# Patient Record
Sex: MALE | Race: WHITE | NOT HISPANIC OR LATINO | Employment: UNEMPLOYED | URBAN - NONMETROPOLITAN AREA
[De-identification: names, ages, dates, MRNs, and addresses within clinical notes are randomized per-mention and may not be internally consistent; named-entity substitution may affect disease eponyms.]

---

## 2017-11-27 ENCOUNTER — HOSPITAL ENCOUNTER (INPATIENT)
Facility: HOSPITAL | Age: 37
LOS: 2 days | Discharge: HOME | End: 2017-11-29
Attending: PSYCHIATRY & NEUROLOGY | Admitting: PSYCHIATRY & NEUROLOGY

## 2017-11-27 ENCOUNTER — HOSPITAL ENCOUNTER (EMERGENCY)
Facility: HOSPITAL | Age: 37
Discharge: STILL ADMITTED OR ADMINISTRATIVE | End: 2017-11-27
Attending: FAMILY MEDICINE

## 2017-11-27 VITALS
HEART RATE: 92 BPM | DIASTOLIC BLOOD PRESSURE: 77 MMHG | BODY MASS INDEX: 18.48 KG/M2 | HEIGHT: 71 IN | TEMPERATURE: 97.2 F | WEIGHT: 132 LBS | SYSTOLIC BLOOD PRESSURE: 131 MMHG | OXYGEN SATURATION: 99 % | RESPIRATION RATE: 18 BRPM

## 2017-11-27 DIAGNOSIS — R45.851 DEPRESSION WITH SUICIDAL IDEATION: Primary | ICD-10-CM

## 2017-11-27 DIAGNOSIS — F32.A DEPRESSION WITH SUICIDAL IDEATION: Primary | ICD-10-CM

## 2017-11-27 LAB
6-ACETYL MORPHINE: NEGATIVE
ALBUMIN SERPL-MCNC: 4.5 G/DL (ref 3.5–5)
ALBUMIN/GLOB SERPL: 1.7 G/DL (ref 1.5–2.5)
ALP SERPL-CCNC: 127 U/L (ref 40–129)
ALT SERPL W P-5'-P-CCNC: 81 U/L (ref 10–44)
AMPHET+METHAMPHET UR QL: NEGATIVE
ANION GAP SERPL CALCULATED.3IONS-SCNC: 5.9 MMOL/L (ref 3.6–11.2)
AST SERPL-CCNC: 156 U/L (ref 10–34)
BARBITURATES UR QL SCN: NEGATIVE
BASOPHILS # BLD AUTO: 0.06 10*3/MM3 (ref 0–0.3)
BASOPHILS NFR BLD AUTO: 0.5 % (ref 0–2)
BENZODIAZ UR QL SCN: NEGATIVE
BILIRUB SERPL-MCNC: 0.3 MG/DL (ref 0.2–1.8)
BILIRUB UR QL STRIP: NEGATIVE
BUN BLD-MCNC: <5 MG/DL (ref 7–21)
BUN/CREAT SERPL: ABNORMAL (ref 7–25)
BUPRENORPHINE SERPL-MCNC: NEGATIVE NG/ML
CALCIUM SPEC-SCNC: 9.3 MG/DL (ref 7.7–10)
CANNABINOIDS SERPL QL: NEGATIVE
CHLORIDE SERPL-SCNC: 108 MMOL/L (ref 99–112)
CLARITY UR: CLEAR
CO2 SERPL-SCNC: 27.1 MMOL/L (ref 24.3–31.9)
COCAINE UR QL: NEGATIVE
COLOR UR: YELLOW
CREAT BLD-MCNC: 0.66 MG/DL (ref 0.43–1.29)
DEPRECATED RDW RBC AUTO: 45.6 FL (ref 37–54)
EOSINOPHIL # BLD AUTO: 0.38 10*3/MM3 (ref 0–0.7)
EOSINOPHIL NFR BLD AUTO: 3.4 % (ref 0–5)
ERYTHROCYTE [DISTWIDTH] IN BLOOD BY AUTOMATED COUNT: 14.2 % (ref 11.5–14.5)
ETHANOL BLD-MCNC: <10 MG/DL
ETHANOL UR QL: <0.01 %
GFR SERPL CREATININE-BSD FRML MDRD: 134 ML/MIN/1.73
GLOBULIN UR ELPH-MCNC: 2.7 GM/DL
GLUCOSE BLD-MCNC: 95 MG/DL (ref 70–110)
GLUCOSE UR STRIP-MCNC: ABNORMAL MG/DL
HCT VFR BLD AUTO: 46.5 % (ref 42–52)
HGB BLD-MCNC: 15.6 G/DL (ref 14–18)
HGB UR QL STRIP.AUTO: NEGATIVE
IMM GRANULOCYTES # BLD: 0.01 10*3/MM3 (ref 0–0.03)
IMM GRANULOCYTES NFR BLD: 0.1 % (ref 0–0.5)
KETONES UR QL STRIP: NEGATIVE
LEUKOCYTE ESTERASE UR QL STRIP.AUTO: NEGATIVE
LYMPHOCYTES # BLD AUTO: 4.19 10*3/MM3 (ref 1–3)
LYMPHOCYTES NFR BLD AUTO: 37.9 % (ref 21–51)
MCH RBC QN AUTO: 29.7 PG (ref 27–33)
MCHC RBC AUTO-ENTMCNC: 33.5 G/DL (ref 33–37)
MCV RBC AUTO: 88.4 FL (ref 80–94)
METHADONE UR QL SCN: NEGATIVE
MONOCYTES # BLD AUTO: 0.84 10*3/MM3 (ref 0.1–0.9)
MONOCYTES NFR BLD AUTO: 7.6 % (ref 0–10)
NEUTROPHILS # BLD AUTO: 5.59 10*3/MM3 (ref 1.4–6.5)
NEUTROPHILS NFR BLD AUTO: 50.5 % (ref 30–70)
NITRITE UR QL STRIP: NEGATIVE
OPIATES UR QL: NEGATIVE
OSMOLALITY SERPL CALC.SUM OF ELEC: NORMAL MOSM/KG (ref 273–305)
OXYCODONE UR QL SCN: POSITIVE
PCP UR QL SCN: NEGATIVE
PH UR STRIP.AUTO: 7.5 [PH] (ref 5–8)
PLATELET # BLD AUTO: 243 10*3/MM3 (ref 130–400)
PMV BLD AUTO: 11.3 FL (ref 6–10)
POTASSIUM BLD-SCNC: 3 MMOL/L (ref 3.5–5.3)
PROT SERPL-MCNC: 7.2 G/DL (ref 6–8)
PROT UR QL STRIP: NEGATIVE
RBC # BLD AUTO: 5.26 10*6/MM3 (ref 4.7–6.1)
SODIUM BLD-SCNC: 141 MMOL/L (ref 135–153)
SP GR UR STRIP: 1.01 (ref 1–1.03)
UROBILINOGEN UR QL STRIP: ABNORMAL
WBC NRBC COR # BLD: 11.07 10*3/MM3 (ref 4.5–12.5)

## 2017-11-27 PROCEDURE — 93005 ELECTROCARDIOGRAM TRACING: CPT | Performed by: PSYCHIATRY & NEUROLOGY

## 2017-11-27 PROCEDURE — 93010 ELECTROCARDIOGRAM REPORT: CPT | Performed by: INTERNAL MEDICINE

## 2017-11-27 RX ORDER — OXYCODONE AND ACETAMINOPHEN 10; 325 MG/1; MG/1
1 TABLET ORAL 3 TIMES DAILY PRN
Status: DISPENSED | OUTPATIENT
Start: 2017-11-27 | End: 2017-11-28

## 2017-11-27 RX ORDER — BENZONATATE 100 MG/1
100 CAPSULE ORAL 3 TIMES DAILY PRN
Status: DISCONTINUED | OUTPATIENT
Start: 2017-11-27 | End: 2017-11-29 | Stop reason: HOSPADM

## 2017-11-27 RX ORDER — ECHINACEA PURPUREA EXTRACT 125 MG
2 TABLET ORAL AS NEEDED
Status: DISCONTINUED | OUTPATIENT
Start: 2017-11-27 | End: 2017-11-29 | Stop reason: HOSPADM

## 2017-11-27 RX ORDER — ONDANSETRON 4 MG/1
4 TABLET, FILM COATED ORAL EVERY 6 HOURS PRN
Status: DISCONTINUED | OUTPATIENT
Start: 2017-11-27 | End: 2017-11-29 | Stop reason: HOSPADM

## 2017-11-27 RX ORDER — FAMOTIDINE 20 MG/1
20 TABLET, FILM COATED ORAL 2 TIMES DAILY PRN
Status: DISCONTINUED | OUTPATIENT
Start: 2017-11-27 | End: 2017-11-29 | Stop reason: HOSPADM

## 2017-11-27 RX ORDER — GABAPENTIN 400 MG/1
800 CAPSULE ORAL EVERY 8 HOURS SCHEDULED
Status: DISCONTINUED | OUTPATIENT
Start: 2017-11-27 | End: 2017-11-28

## 2017-11-27 RX ORDER — LOPERAMIDE HYDROCHLORIDE 2 MG/1
2 CAPSULE ORAL 4 TIMES DAILY PRN
Status: DISCONTINUED | OUTPATIENT
Start: 2017-11-27 | End: 2017-11-29 | Stop reason: HOSPADM

## 2017-11-27 RX ORDER — POTASSIUM CHLORIDE 750 MG/1
40 CAPSULE, EXTENDED RELEASE ORAL AS NEEDED
Status: DISCONTINUED | OUTPATIENT
Start: 2017-11-27 | End: 2017-11-29 | Stop reason: HOSPADM

## 2017-11-27 RX ORDER — TRAZODONE HYDROCHLORIDE 50 MG/1
50 TABLET ORAL NIGHTLY PRN
Status: DISCONTINUED | OUTPATIENT
Start: 2017-11-27 | End: 2017-11-29 | Stop reason: HOSPADM

## 2017-11-27 RX ORDER — OXYCODONE AND ACETAMINOPHEN 10; 325 MG/1; MG/1
1 TABLET ORAL 3 TIMES DAILY PRN
COMMUNITY

## 2017-11-27 RX ORDER — BENZTROPINE MESYLATE 1 MG/ML
0.5 INJECTION INTRAMUSCULAR; INTRAVENOUS DAILY PRN
Status: DISCONTINUED | OUTPATIENT
Start: 2017-11-27 | End: 2017-11-29 | Stop reason: HOSPADM

## 2017-11-27 RX ORDER — ALUMINA, MAGNESIA, AND SIMETHICONE 2400; 2400; 240 MG/30ML; MG/30ML; MG/30ML
15 SUSPENSION ORAL EVERY 6 HOURS PRN
Status: DISCONTINUED | OUTPATIENT
Start: 2017-11-27 | End: 2017-11-29 | Stop reason: HOSPADM

## 2017-11-27 RX ORDER — BENZTROPINE MESYLATE 1 MG/1
1 TABLET ORAL DAILY PRN
Status: DISCONTINUED | OUTPATIENT
Start: 2017-11-27 | End: 2017-11-29 | Stop reason: HOSPADM

## 2017-11-27 RX ORDER — GABAPENTIN 800 MG/1
800 TABLET ORAL 3 TIMES DAILY
COMMUNITY

## 2017-11-27 RX ORDER — HYDROXYZINE 50 MG/1
50 TABLET, FILM COATED ORAL EVERY 6 HOURS PRN
Status: DISCONTINUED | OUTPATIENT
Start: 2017-11-27 | End: 2017-11-29 | Stop reason: HOSPADM

## 2017-11-27 RX ORDER — ACETAMINOPHEN 325 MG/1
650 TABLET ORAL EVERY 4 HOURS PRN
Status: DISCONTINUED | OUTPATIENT
Start: 2017-11-27 | End: 2017-11-29

## 2017-11-27 RX ORDER — POTASSIUM CHLORIDE 20 MEQ/1
40 TABLET, EXTENDED RELEASE ORAL EVERY 4 HOURS
Status: COMPLETED | OUTPATIENT
Start: 2017-11-27 | End: 2017-11-28

## 2017-11-27 RX ORDER — NICOTINE 21 MG/24HR
1 PATCH, TRANSDERMAL 24 HOURS TRANSDERMAL DAILY
Status: DISCONTINUED | OUTPATIENT
Start: 2017-11-27 | End: 2017-11-29 | Stop reason: HOSPADM

## 2017-11-27 RX ADMIN — POTASSIUM CHLORIDE 40 MEQ: 1500 TABLET, EXTENDED RELEASE ORAL at 17:08

## 2017-11-27 RX ADMIN — NICOTINE 1 PATCH: 21 PATCH TRANSDERMAL at 17:08

## 2017-11-27 RX ADMIN — POTASSIUM CHLORIDE 40 MEQ: 1500 TABLET, EXTENDED RELEASE ORAL at 21:31

## 2017-11-27 RX ADMIN — GABAPENTIN 800 MG: 400 CAPSULE ORAL at 21:31

## 2017-11-27 RX ADMIN — PANCRELIPASE 24000 UNITS OF LIPASE: 60000; 12000; 38000 CAPSULE, DELAYED RELEASE PELLETS ORAL at 19:49

## 2017-11-27 RX ADMIN — OXYCODONE HYDROCHLORIDE AND ACETAMINOPHEN 1 TABLET: 10; 325 TABLET ORAL at 19:51

## 2017-11-28 LAB — POTASSIUM BLD-SCNC: 4.4 MMOL/L (ref 3.5–5.3)

## 2017-11-28 PROCEDURE — 84132 ASSAY OF SERUM POTASSIUM: CPT | Performed by: PSYCHIATRY & NEUROLOGY

## 2017-11-28 RX ORDER — GABAPENTIN 400 MG/1
800 CAPSULE ORAL 3 TIMES DAILY
Status: DISCONTINUED | OUTPATIENT
Start: 2017-11-28 | End: 2017-11-29 | Stop reason: HOSPADM

## 2017-11-28 RX ORDER — OXYCODONE AND ACETAMINOPHEN 10; 325 MG/1; MG/1
1 TABLET ORAL 3 TIMES DAILY PRN
Status: DISPENSED | OUTPATIENT
Start: 2017-11-28 | End: 2017-11-29

## 2017-11-28 RX ADMIN — GABAPENTIN 800 MG: 400 CAPSULE ORAL at 16:57

## 2017-11-28 RX ADMIN — OXYCODONE HYDROCHLORIDE AND ACETAMINOPHEN 1 TABLET: 10; 325 TABLET ORAL at 14:28

## 2017-11-28 RX ADMIN — PANCRELIPASE 24000 UNITS OF LIPASE: 60000; 12000; 38000 CAPSULE, DELAYED RELEASE PELLETS ORAL at 17:06

## 2017-11-28 RX ADMIN — GABAPENTIN 800 MG: 400 CAPSULE ORAL at 20:45

## 2017-11-28 RX ADMIN — PANCRELIPASE 24000 UNITS OF LIPASE: 60000; 12000; 38000 CAPSULE, DELAYED RELEASE PELLETS ORAL at 12:29

## 2017-11-28 RX ADMIN — PANCRELIPASE 24000 UNITS OF LIPASE: 60000; 12000; 38000 CAPSULE, DELAYED RELEASE PELLETS ORAL at 08:48

## 2017-11-28 RX ADMIN — TRAZODONE HYDROCHLORIDE 50 MG: 50 TABLET ORAL at 20:45

## 2017-11-28 RX ADMIN — POTASSIUM CHLORIDE 40 MEQ: 1500 TABLET, EXTENDED RELEASE ORAL at 02:17

## 2017-11-28 RX ADMIN — NICOTINE 1 PATCH: 21 PATCH TRANSDERMAL at 08:47

## 2017-11-28 RX ADMIN — ACETAMINOPHEN 650 MG: 325 TABLET ORAL at 15:27

## 2017-11-28 RX ADMIN — GABAPENTIN 800 MG: 400 CAPSULE ORAL at 05:24

## 2017-11-28 RX ADMIN — OXYCODONE HYDROCHLORIDE AND ACETAMINOPHEN 1 TABLET: 10; 325 TABLET ORAL at 05:47

## 2017-11-29 VITALS
WEIGHT: 129 LBS | RESPIRATION RATE: 18 BRPM | TEMPERATURE: 98.6 F | HEIGHT: 71 IN | BODY MASS INDEX: 18.06 KG/M2 | SYSTOLIC BLOOD PRESSURE: 121 MMHG | DIASTOLIC BLOOD PRESSURE: 73 MMHG | OXYGEN SATURATION: 95 % | HEART RATE: 99 BPM

## 2017-11-29 RX ORDER — TRAZODONE HYDROCHLORIDE 50 MG/1
50 TABLET ORAL NIGHTLY PRN
Qty: 30 TABLET | Refills: 0 | Status: SHIPPED | OUTPATIENT
Start: 2017-11-29 | End: 2017-12-29

## 2017-11-29 RX ORDER — IBUPROFEN 600 MG/1
600 TABLET ORAL EVERY 6 HOURS SCHEDULED
Status: DISCONTINUED | OUTPATIENT
Start: 2017-11-29 | End: 2017-11-29

## 2017-11-29 RX ORDER — IBUPROFEN 600 MG/1
600 TABLET ORAL EVERY 6 HOURS PRN
Status: DISCONTINUED | OUTPATIENT
Start: 2017-11-29 | End: 2017-11-29 | Stop reason: HOSPADM

## 2017-11-29 RX ADMIN — GABAPENTIN 800 MG: 400 CAPSULE ORAL at 09:03

## 2017-11-29 RX ADMIN — NICOTINE 1 PATCH: 21 PATCH TRANSDERMAL at 09:03

## 2017-11-29 RX ADMIN — PANCRELIPASE 24000 UNITS OF LIPASE: 60000; 12000; 38000 CAPSULE, DELAYED RELEASE PELLETS ORAL at 12:23

## 2017-11-29 RX ADMIN — IBUPROFEN 600 MG: 600 TABLET ORAL at 05:26

## 2017-11-29 RX ADMIN — IBUPROFEN 600 MG: 600 TABLET ORAL at 12:25

## 2017-11-29 RX ADMIN — PANCRELIPASE 24000 UNITS OF LIPASE: 60000; 12000; 38000 CAPSULE, DELAYED RELEASE PELLETS ORAL at 09:03

## 2018-03-18 PROCEDURE — 99284 EMERGENCY DEPT VISIT MOD MDM: CPT

## 2018-03-19 ENCOUNTER — APPOINTMENT (OUTPATIENT)
Dept: CT IMAGING | Facility: HOSPITAL | Age: 38
End: 2018-03-19

## 2018-03-19 ENCOUNTER — HOSPITAL ENCOUNTER (EMERGENCY)
Facility: HOSPITAL | Age: 38
Discharge: HOME | End: 2018-03-19
Attending: FAMILY MEDICINE | Admitting: FAMILY MEDICINE

## 2018-03-19 VITALS
HEART RATE: 80 BPM | TEMPERATURE: 98.4 F | OXYGEN SATURATION: 95 % | DIASTOLIC BLOOD PRESSURE: 92 MMHG | HEIGHT: 71 IN | SYSTOLIC BLOOD PRESSURE: 123 MMHG | WEIGHT: 130 LBS | BODY MASS INDEX: 18.2 KG/M2 | RESPIRATION RATE: 18 BRPM

## 2018-03-19 DIAGNOSIS — E87.6 ACUTE HYPOKALEMIA: ICD-10-CM

## 2018-03-19 DIAGNOSIS — R10.9 ABDOMINAL PAIN, ACUTE: Primary | ICD-10-CM

## 2018-03-19 LAB
ALBUMIN SERPL-MCNC: 3.9 G/DL (ref 3.5–5)
ALBUMIN/GLOB SERPL: 1.4 G/DL (ref 1.5–2.5)
ALP SERPL-CCNC: 112 U/L (ref 40–129)
ALT SERPL W P-5'-P-CCNC: 27 U/L (ref 10–44)
AMYLASE SERPL-CCNC: 15 U/L (ref 28–100)
ANION GAP SERPL CALCULATED.3IONS-SCNC: 5.9 MMOL/L (ref 3.6–11.2)
AST SERPL-CCNC: 23 U/L (ref 10–34)
BASOPHILS # BLD AUTO: 0.08 10*3/MM3 (ref 0–0.3)
BASOPHILS NFR BLD AUTO: 0.5 % (ref 0–2)
BILIRUB SERPL-MCNC: 0.9 MG/DL (ref 0.2–1.8)
BILIRUB UR QL STRIP: NEGATIVE
BUN BLD-MCNC: 10 MG/DL (ref 7–21)
BUN/CREAT SERPL: 12.5 (ref 7–25)
CALCIUM SPEC-SCNC: 9.2 MG/DL (ref 7.7–10)
CHLORIDE SERPL-SCNC: 103 MMOL/L (ref 99–112)
CLARITY UR: CLEAR
CO2 SERPL-SCNC: 28.1 MMOL/L (ref 24.3–31.9)
COLOR UR: YELLOW
CREAT BLD-MCNC: 0.8 MG/DL (ref 0.43–1.29)
DEPRECATED RDW RBC AUTO: 43 FL (ref 37–54)
EOSINOPHIL # BLD AUTO: 0.35 10*3/MM3 (ref 0–0.7)
EOSINOPHIL NFR BLD AUTO: 2.3 % (ref 0–5)
ERYTHROCYTE [DISTWIDTH] IN BLOOD BY AUTOMATED COUNT: 13.8 % (ref 11.5–14.5)
GFR SERPL CREATININE-BSD FRML MDRD: 108 ML/MIN/1.73
GLOBULIN UR ELPH-MCNC: 2.7 GM/DL
GLUCOSE BLD-MCNC: 113 MG/DL (ref 70–110)
GLUCOSE UR STRIP-MCNC: NEGATIVE MG/DL
HCT VFR BLD AUTO: 45 % (ref 42–52)
HGB BLD-MCNC: 15.5 G/DL (ref 14–18)
HGB UR QL STRIP.AUTO: NEGATIVE
IMM GRANULOCYTES # BLD: 0.03 10*3/MM3 (ref 0–0.03)
IMM GRANULOCYTES NFR BLD: 0.2 % (ref 0–0.5)
KETONES UR QL STRIP: NEGATIVE
LEUKOCYTE ESTERASE UR QL STRIP.AUTO: NEGATIVE
LIPASE SERPL-CCNC: 24 U/L (ref 13–60)
LYMPHOCYTES # BLD AUTO: 4.49 10*3/MM3 (ref 1–3)
LYMPHOCYTES NFR BLD AUTO: 30 % (ref 21–51)
MCH RBC QN AUTO: 29 PG (ref 27–33)
MCHC RBC AUTO-ENTMCNC: 34.4 G/DL (ref 33–37)
MCV RBC AUTO: 84.3 FL (ref 80–94)
MONOCYTES # BLD AUTO: 1.22 10*3/MM3 (ref 0.1–0.9)
MONOCYTES NFR BLD AUTO: 8.1 % (ref 0–10)
NEUTROPHILS # BLD AUTO: 8.81 10*3/MM3 (ref 1.4–6.5)
NEUTROPHILS NFR BLD AUTO: 58.9 % (ref 30–70)
NITRITE UR QL STRIP: NEGATIVE
OSMOLALITY SERPL CALC.SUM OF ELEC: 273.7 MOSM/KG (ref 273–305)
PH UR STRIP.AUTO: 7.5 [PH] (ref 5–8)
PLATELET # BLD AUTO: 338 10*3/MM3 (ref 130–400)
PMV BLD AUTO: 10.7 FL (ref 6–10)
POTASSIUM BLD-SCNC: 2.7 MMOL/L (ref 3.5–5.3)
PROT SERPL-MCNC: 6.6 G/DL (ref 6–8)
PROT UR QL STRIP: NEGATIVE
RBC # BLD AUTO: 5.34 10*6/MM3 (ref 4.7–6.1)
SODIUM BLD-SCNC: 137 MMOL/L (ref 135–153)
SP GR UR STRIP: <=1.005 (ref 1–1.03)
UROBILINOGEN UR QL STRIP: NORMAL
WBC NRBC COR # BLD: 14.98 10*3/MM3 (ref 4.5–12.5)

## 2018-03-19 PROCEDURE — 25010000002 HYDROMORPHONE PER 4 MG: Performed by: FAMILY MEDICINE

## 2018-03-19 PROCEDURE — 74176 CT ABD & PELVIS W/O CONTRAST: CPT

## 2018-03-19 PROCEDURE — 96375 TX/PRO/DX INJ NEW DRUG ADDON: CPT

## 2018-03-19 PROCEDURE — 96374 THER/PROPH/DIAG INJ IV PUSH: CPT

## 2018-03-19 PROCEDURE — 85025 COMPLETE CBC W/AUTO DIFF WBC: CPT | Performed by: FAMILY MEDICINE

## 2018-03-19 PROCEDURE — 93005 ELECTROCARDIOGRAM TRACING: CPT | Performed by: FAMILY MEDICINE

## 2018-03-19 PROCEDURE — 96361 HYDRATE IV INFUSION ADD-ON: CPT

## 2018-03-19 PROCEDURE — 74176 CT ABD & PELVIS W/O CONTRAST: CPT | Performed by: RADIOLOGY

## 2018-03-19 PROCEDURE — 82150 ASSAY OF AMYLASE: CPT | Performed by: FAMILY MEDICINE

## 2018-03-19 PROCEDURE — 25010000002 ONDANSETRON PER 1 MG: Performed by: FAMILY MEDICINE

## 2018-03-19 PROCEDURE — 81003 URINALYSIS AUTO W/O SCOPE: CPT | Performed by: FAMILY MEDICINE

## 2018-03-19 PROCEDURE — 83690 ASSAY OF LIPASE: CPT | Performed by: FAMILY MEDICINE

## 2018-03-19 PROCEDURE — 93010 ELECTROCARDIOGRAM REPORT: CPT | Performed by: INTERNAL MEDICINE

## 2018-03-19 PROCEDURE — 80053 COMPREHEN METABOLIC PANEL: CPT | Performed by: FAMILY MEDICINE

## 2018-03-19 RX ORDER — NICOTINE 21 MG/24HR
1 PATCH, TRANSDERMAL 24 HOURS TRANSDERMAL ONCE
Status: DISCONTINUED | OUTPATIENT
Start: 2018-03-19 | End: 2018-03-19 | Stop reason: HOSPADM

## 2018-03-19 RX ORDER — POTASSIUM CHLORIDE 7.45 MG/ML
10 INJECTION INTRAVENOUS ONCE
Status: DISCONTINUED | OUTPATIENT
Start: 2018-03-19 | End: 2018-03-19

## 2018-03-19 RX ORDER — POTASSIUM CHLORIDE 20 MEQ/1
40 TABLET, EXTENDED RELEASE ORAL ONCE
Status: COMPLETED | OUTPATIENT
Start: 2018-03-19 | End: 2018-03-19

## 2018-03-19 RX ORDER — METAXALONE 800 MG/1
800 TABLET ORAL 4 TIMES DAILY PRN
Qty: 40 TABLET | Refills: 0 | Status: SHIPPED | OUTPATIENT
Start: 2018-03-19 | End: 2019-03-05

## 2018-03-19 RX ORDER — OXYCODONE AND ACETAMINOPHEN 10; 325 MG/1; MG/1
1 TABLET ORAL ONCE
Status: COMPLETED | OUTPATIENT
Start: 2018-03-19 | End: 2018-03-19

## 2018-03-19 RX ORDER — SODIUM CHLORIDE 0.9 % (FLUSH) 0.9 %
10 SYRINGE (ML) INJECTION AS NEEDED
Status: DISCONTINUED | OUTPATIENT
Start: 2018-03-19 | End: 2018-03-19 | Stop reason: HOSPADM

## 2018-03-19 RX ORDER — HYDROMORPHONE HCL 110MG/55ML
1 PATIENT CONTROLLED ANALGESIA SYRINGE INTRAVENOUS ONCE
Status: COMPLETED | OUTPATIENT
Start: 2018-03-19 | End: 2018-03-19

## 2018-03-19 RX ORDER — ONDANSETRON 2 MG/ML
4 INJECTION INTRAMUSCULAR; INTRAVENOUS ONCE
Status: COMPLETED | OUTPATIENT
Start: 2018-03-19 | End: 2018-03-19

## 2018-03-19 RX ORDER — FAMOTIDINE 10 MG/ML
20 INJECTION, SOLUTION INTRAVENOUS ONCE
Status: COMPLETED | OUTPATIENT
Start: 2018-03-19 | End: 2018-03-19

## 2018-03-19 RX ADMIN — FAMOTIDINE 20 MG: 10 INJECTION INTRAVENOUS at 00:44

## 2018-03-19 RX ADMIN — HYDROMORPHONE HYDROCHLORIDE 1 MG: 2 INJECTION INTRAMUSCULAR; INTRAVENOUS; SUBCUTANEOUS at 00:45

## 2018-03-19 RX ADMIN — POTASSIUM CHLORIDE 40 MEQ: 1500 TABLET, EXTENDED RELEASE ORAL at 01:29

## 2018-03-19 RX ADMIN — OXYCODONE HYDROCHLORIDE AND ACETAMINOPHEN 1 TABLET: 10; 325 TABLET ORAL at 02:08

## 2018-03-19 RX ADMIN — SODIUM CHLORIDE 1000 ML: 9 INJECTION, SOLUTION INTRAVENOUS at 00:41

## 2018-03-19 RX ADMIN — NICOTINE 1 PATCH: 21 PATCH TRANSDERMAL at 01:18

## 2018-03-19 RX ADMIN — ONDANSETRON 4 MG: 2 INJECTION, SOLUTION INTRAMUSCULAR; INTRAVENOUS at 00:42
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
## 2018-04-07 ENCOUNTER — HOSPITAL ENCOUNTER (EMERGENCY)
Facility: HOSPITAL | Age: 38
Discharge: HOME | End: 2018-04-07
Attending: EMERGENCY MEDICINE | Admitting: EMERGENCY MEDICINE

## 2018-04-07 ENCOUNTER — APPOINTMENT (OUTPATIENT)
Dept: GENERAL RADIOLOGY | Facility: HOSPITAL | Age: 38
End: 2018-04-07

## 2018-04-07 VITALS
WEIGHT: 130 LBS | DIASTOLIC BLOOD PRESSURE: 80 MMHG | RESPIRATION RATE: 18 BRPM | BODY MASS INDEX: 18.2 KG/M2 | TEMPERATURE: 98.7 F | HEART RATE: 79 BPM | OXYGEN SATURATION: 98 % | SYSTOLIC BLOOD PRESSURE: 112 MMHG | HEIGHT: 71 IN

## 2018-04-07 DIAGNOSIS — R10.12 LUQ ABDOMINAL PAIN: Primary | ICD-10-CM

## 2018-04-07 LAB
ALBUMIN SERPL-MCNC: 3.7 G/DL (ref 3.5–5)
ALBUMIN/GLOB SERPL: 1.4 G/DL (ref 1.5–2.5)
ALP SERPL-CCNC: 110 U/L (ref 40–129)
ALT SERPL W P-5'-P-CCNC: 24 U/L (ref 10–44)
AMYLASE SERPL-CCNC: 27 U/L (ref 28–100)
ANION GAP SERPL CALCULATED.3IONS-SCNC: 4.4 MMOL/L (ref 3.6–11.2)
AST SERPL-CCNC: 21 U/L (ref 10–34)
BASOPHILS # BLD AUTO: 0.04 10*3/MM3 (ref 0–0.3)
BASOPHILS NFR BLD AUTO: 0.4 % (ref 0–2)
BILIRUB SERPL-MCNC: 0.3 MG/DL (ref 0.2–1.8)
BILIRUB UR QL STRIP: NEGATIVE
BUN BLD-MCNC: <5 MG/DL (ref 7–21)
BUN/CREAT SERPL: ABNORMAL (ref 7–25)
CALCIUM SPEC-SCNC: 8.8 MG/DL (ref 7.7–10)
CHLORIDE SERPL-SCNC: 107 MMOL/L (ref 99–112)
CLARITY UR: CLEAR
CO2 SERPL-SCNC: 28.6 MMOL/L (ref 24.3–31.9)
COLOR UR: YELLOW
CREAT BLD-MCNC: 0.88 MG/DL (ref 0.43–1.29)
DEPRECATED RDW RBC AUTO: 48.2 FL (ref 37–54)
EOSINOPHIL # BLD AUTO: 0.39 10*3/MM3 (ref 0–0.7)
EOSINOPHIL NFR BLD AUTO: 3.9 % (ref 0–5)
ERYTHROCYTE [DISTWIDTH] IN BLOOD BY AUTOMATED COUNT: 14.9 % (ref 11.5–14.5)
GFR SERPL CREATININE-BSD FRML MDRD: 96 ML/MIN/1.73
GLOBULIN UR ELPH-MCNC: 2.6 GM/DL
GLUCOSE BLD-MCNC: 140 MG/DL (ref 70–110)
GLUCOSE UR STRIP-MCNC: NEGATIVE MG/DL
HCT VFR BLD AUTO: 42.7 % (ref 42–52)
HGB BLD-MCNC: 14.6 G/DL (ref 14–18)
HGB UR QL STRIP.AUTO: NEGATIVE
HOLD SPECIMEN: NORMAL
HOLD SPECIMEN: NORMAL
IMM GRANULOCYTES # BLD: 0.02 10*3/MM3 (ref 0–0.03)
IMM GRANULOCYTES NFR BLD: 0.2 % (ref 0–0.5)
KETONES UR QL STRIP: NEGATIVE
LEUKOCYTE ESTERASE UR QL STRIP.AUTO: NEGATIVE
LIPASE SERPL-CCNC: 23 U/L (ref 13–60)
LYMPHOCYTES # BLD AUTO: 3.19 10*3/MM3 (ref 1–3)
LYMPHOCYTES NFR BLD AUTO: 31.6 % (ref 21–51)
MCH RBC QN AUTO: 30.2 PG (ref 27–33)
MCHC RBC AUTO-ENTMCNC: 34.2 G/DL (ref 33–37)
MCV RBC AUTO: 88.2 FL (ref 80–94)
MONOCYTES # BLD AUTO: 0.58 10*3/MM3 (ref 0.1–0.9)
MONOCYTES NFR BLD AUTO: 5.7 % (ref 0–10)
NEUTROPHILS # BLD AUTO: 5.87 10*3/MM3 (ref 1.4–6.5)
NEUTROPHILS NFR BLD AUTO: 58.2 % (ref 30–70)
NITRITE UR QL STRIP: NEGATIVE
OSMOLALITY SERPL CALC.SUM OF ELEC: NORMAL MOSM/KG (ref 273–305)
PH UR STRIP.AUTO: 6.5 [PH] (ref 5–8)
PLATELET # BLD AUTO: 265 10*3/MM3 (ref 130–400)
PMV BLD AUTO: 11.1 FL (ref 6–10)
POTASSIUM BLD-SCNC: 3.2 MMOL/L (ref 3.5–5.3)
PROT SERPL-MCNC: 6.3 G/DL (ref 6–8)
PROT UR QL STRIP: NEGATIVE
RBC # BLD AUTO: 4.84 10*6/MM3 (ref 4.7–6.1)
SODIUM BLD-SCNC: 140 MMOL/L (ref 135–153)
SP GR UR STRIP: 1.01 (ref 1–1.03)
UROBILINOGEN UR QL STRIP: NORMAL
WBC NRBC COR # BLD: 10.09 10*3/MM3 (ref 4.5–12.5)
WHOLE BLOOD HOLD SPECIMEN: NORMAL
WHOLE BLOOD HOLD SPECIMEN: NORMAL

## 2018-04-07 PROCEDURE — 99284 EMERGENCY DEPT VISIT MOD MDM: CPT

## 2018-04-07 PROCEDURE — 96361 HYDRATE IV INFUSION ADD-ON: CPT

## 2018-04-07 PROCEDURE — 85025 COMPLETE CBC W/AUTO DIFF WBC: CPT | Performed by: EMERGENCY MEDICINE

## 2018-04-07 PROCEDURE — 82150 ASSAY OF AMYLASE: CPT | Performed by: EMERGENCY MEDICINE

## 2018-04-07 PROCEDURE — 74022 RADEX COMPL AQT ABD SERIES: CPT | Performed by: RADIOLOGY

## 2018-04-07 PROCEDURE — 96375 TX/PRO/DX INJ NEW DRUG ADDON: CPT

## 2018-04-07 PROCEDURE — 25010000002 KETOROLAC TROMETHAMINE PER 15 MG: Performed by: EMERGENCY MEDICINE

## 2018-04-07 PROCEDURE — 83690 ASSAY OF LIPASE: CPT | Performed by: EMERGENCY MEDICINE

## 2018-04-07 PROCEDURE — 74022 RADEX COMPL AQT ABD SERIES: CPT

## 2018-04-07 PROCEDURE — 80053 COMPREHEN METABOLIC PANEL: CPT | Performed by: EMERGENCY MEDICINE

## 2018-04-07 PROCEDURE — 81003 URINALYSIS AUTO W/O SCOPE: CPT | Performed by: EMERGENCY MEDICINE

## 2018-04-07 PROCEDURE — 96374 THER/PROPH/DIAG INJ IV PUSH: CPT

## 2018-04-07 RX ORDER — SODIUM CHLORIDE 0.9 % (FLUSH) 0.9 %
10 SYRINGE (ML) INJECTION AS NEEDED
Status: DISCONTINUED | OUTPATIENT
Start: 2018-04-07 | End: 2018-04-07 | Stop reason: HOSPADM

## 2018-04-07 RX ORDER — SODIUM CHLORIDE 9 MG/ML
1000 INJECTION, SOLUTION INTRAVENOUS ONCE
Status: COMPLETED | OUTPATIENT
Start: 2018-04-07 | End: 2018-04-07

## 2018-04-07 RX ORDER — FAMOTIDINE 10 MG/ML
20 INJECTION, SOLUTION INTRAVENOUS ONCE
Status: COMPLETED | OUTPATIENT
Start: 2018-04-07 | End: 2018-04-07

## 2018-04-07 RX ORDER — POTASSIUM CHLORIDE 20 MEQ/1
40 TABLET, EXTENDED RELEASE ORAL ONCE
Status: COMPLETED | OUTPATIENT
Start: 2018-04-07 | End: 2018-04-07

## 2018-04-07 RX ORDER — KETOROLAC TROMETHAMINE 30 MG/ML
30 INJECTION, SOLUTION INTRAMUSCULAR; INTRAVENOUS EVERY 6 HOURS PRN
Status: DISCONTINUED | OUTPATIENT
Start: 2018-04-07 | End: 2018-04-07 | Stop reason: HOSPADM

## 2018-04-07 RX ORDER — ALUMINA, MAGNESIA, AND SIMETHICONE 2400; 2400; 240 MG/30ML; MG/30ML; MG/30ML
15 SUSPENSION ORAL ONCE
Status: COMPLETED | OUTPATIENT
Start: 2018-04-07 | End: 2018-04-07

## 2018-04-07 RX ADMIN — POTASSIUM CHLORIDE 40 MEQ: 1500 TABLET, EXTENDED RELEASE ORAL at 17:50

## 2018-04-07 RX ADMIN — FAMOTIDINE 20 MG: 10 INJECTION INTRAVENOUS at 16:59

## 2018-04-07 RX ADMIN — LIDOCAINE HYDROCHLORIDE 15 ML: 20 SOLUTION ORAL; TOPICAL at 16:59

## 2018-04-07 RX ADMIN — ALUMINUM HYDROXIDE, MAGNESIUM HYDROXIDE, AND DIMETHICONE 15 ML: 400; 400; 40 SUSPENSION ORAL at 16:59

## 2018-04-07 RX ADMIN — KETOROLAC TROMETHAMINE 30 MG: 30 INJECTION, SOLUTION INTRAMUSCULAR; INTRAVENOUS at 17:17

## 2018-04-07 RX ADMIN — SODIUM CHLORIDE 1000 ML: 9 INJECTION, SOLUTION INTRAVENOUS at 16:58

## 2018-04-07 NOTE — ED NOTES
"Pt wife came to nurse's station and states \"he needs something else for pain. If they ain't gonna do something for him, we're gonna go to [place]\". Provider notified.      Siva Ochoa RN  04/07/18 1712       Siva Ochoa RN  04/07/18 1720    "

## 2018-06-06 ENCOUNTER — HOSPITAL ENCOUNTER (EMERGENCY)
Facility: HOSPITAL | Age: 38
Discharge: HOME | End: 2018-06-06
Attending: EMERGENCY MEDICINE | Admitting: EMERGENCY MEDICINE

## 2018-06-06 ENCOUNTER — APPOINTMENT (OUTPATIENT)
Dept: GENERAL RADIOLOGY | Facility: HOSPITAL | Age: 38
End: 2018-06-06

## 2018-06-06 ENCOUNTER — APPOINTMENT (OUTPATIENT)
Dept: CT IMAGING | Facility: HOSPITAL | Age: 38
End: 2018-06-06

## 2018-06-06 VITALS
HEIGHT: 71 IN | SYSTOLIC BLOOD PRESSURE: 110 MMHG | TEMPERATURE: 98.3 F | DIASTOLIC BLOOD PRESSURE: 67 MMHG | RESPIRATION RATE: 16 BRPM | OXYGEN SATURATION: 99 % | BODY MASS INDEX: 18.9 KG/M2 | HEART RATE: 74 BPM | WEIGHT: 135 LBS

## 2018-06-06 DIAGNOSIS — K52.9 COLITIS: Primary | ICD-10-CM

## 2018-06-06 LAB
6-ACETYL MORPHINE: NEGATIVE
ALBUMIN SERPL-MCNC: 3.7 G/DL (ref 3.5–5)
ALBUMIN/GLOB SERPL: 1.5 G/DL (ref 1.5–2.5)
ALP SERPL-CCNC: 110 U/L (ref 40–129)
ALT SERPL W P-5'-P-CCNC: 28 U/L (ref 10–44)
AMPHET+METHAMPHET UR QL: NEGATIVE
AMYLASE SERPL-CCNC: 30 U/L (ref 28–100)
ANION GAP SERPL CALCULATED.3IONS-SCNC: 4.5 MMOL/L (ref 3.6–11.2)
AST SERPL-CCNC: 23 U/L (ref 10–34)
BARBITURATES UR QL SCN: NEGATIVE
BASOPHILS # BLD AUTO: 0.04 10*3/MM3 (ref 0–0.3)
BASOPHILS NFR BLD AUTO: 0.3 % (ref 0–2)
BENZODIAZ UR QL SCN: NEGATIVE
BILIRUB SERPL-MCNC: 0.3 MG/DL (ref 0.2–1.8)
BILIRUB UR QL STRIP: NEGATIVE
BUN BLD-MCNC: <5 MG/DL (ref 7–21)
BUN/CREAT SERPL: ABNORMAL (ref 7–25)
BUPRENORPHINE SERPL-MCNC: NEGATIVE NG/ML
CALCIUM SPEC-SCNC: 8.7 MG/DL (ref 7.7–10)
CANNABINOIDS SERPL QL: NEGATIVE
CHLORIDE SERPL-SCNC: 108 MMOL/L (ref 99–112)
CLARITY UR: CLEAR
CO2 SERPL-SCNC: 28.5 MMOL/L (ref 24.3–31.9)
COCAINE UR QL: NEGATIVE
COLOR UR: YELLOW
CREAT BLD-MCNC: 0.64 MG/DL (ref 0.43–1.29)
DEPRECATED RDW RBC AUTO: 46.1 FL (ref 37–54)
EOSINOPHIL # BLD AUTO: 0.47 10*3/MM3 (ref 0–0.7)
EOSINOPHIL NFR BLD AUTO: 3.2 % (ref 0–5)
ERYTHROCYTE [DISTWIDTH] IN BLOOD BY AUTOMATED COUNT: 14.5 % (ref 11.5–14.5)
GFR SERPL CREATININE-BSD FRML MDRD: 139 ML/MIN/1.73
GLOBULIN UR ELPH-MCNC: 2.5 GM/DL
GLUCOSE BLD-MCNC: 126 MG/DL (ref 70–110)
GLUCOSE UR STRIP-MCNC: NEGATIVE MG/DL
HCT VFR BLD AUTO: 44.3 % (ref 42–52)
HGB BLD-MCNC: 15.1 G/DL (ref 14–18)
HGB UR QL STRIP.AUTO: NEGATIVE
IMM GRANULOCYTES # BLD: 0.03 10*3/MM3 (ref 0–0.03)
IMM GRANULOCYTES NFR BLD: 0.2 % (ref 0–0.5)
INR PPP: 1.09 (ref 0.9–1.1)
KETONES UR QL STRIP: NEGATIVE
LEUKOCYTE ESTERASE UR QL STRIP.AUTO: NEGATIVE
LIPASE SERPL-CCNC: 25 U/L (ref 13–60)
LYMPHOCYTES # BLD AUTO: 6.33 10*3/MM3 (ref 1–3)
LYMPHOCYTES NFR BLD AUTO: 42.8 % (ref 21–51)
MCH RBC QN AUTO: 29.5 PG (ref 27–33)
MCHC RBC AUTO-ENTMCNC: 34.1 G/DL (ref 33–37)
MCV RBC AUTO: 86.5 FL (ref 80–94)
METHADONE UR QL SCN: NEGATIVE
MONOCYTES # BLD AUTO: 0.94 10*3/MM3 (ref 0.1–0.9)
MONOCYTES NFR BLD AUTO: 6.4 % (ref 0–10)
NEUTROPHILS # BLD AUTO: 6.97 10*3/MM3 (ref 1.4–6.5)
NEUTROPHILS NFR BLD AUTO: 47.1 % (ref 30–70)
NITRITE UR QL STRIP: NEGATIVE
NRBC BLD MANUAL-RTO: 0 /100 WBC (ref 0–0)
OPIATES UR QL: NEGATIVE
OSMOLALITY SERPL CALC.SUM OF ELEC: NORMAL MOSM/KG (ref 273–305)
OXYCODONE UR QL SCN: NEGATIVE
PCP UR QL SCN: NEGATIVE
PH UR STRIP.AUTO: 7 [PH] (ref 5–8)
PLATELET # BLD AUTO: 318 10*3/MM3 (ref 130–400)
PMV BLD AUTO: 11 FL (ref 6–10)
POTASSIUM BLD-SCNC: 3.3 MMOL/L (ref 3.5–5.3)
PROT SERPL-MCNC: 6.2 G/DL (ref 6–8)
PROT UR QL STRIP: NEGATIVE
PROTHROMBIN TIME: 14.3 SECONDS (ref 11–15.4)
RBC # BLD AUTO: 5.12 10*6/MM3 (ref 4.7–6.1)
SODIUM BLD-SCNC: 141 MMOL/L (ref 135–153)
SP GR UR STRIP: 1.01 (ref 1–1.03)
TROPONIN I SERPL-MCNC: <0.006 NG/ML
UROBILINOGEN UR QL STRIP: NORMAL
WBC NRBC COR # BLD: 14.78 10*3/MM3 (ref 4.5–12.5)

## 2018-06-06 PROCEDURE — 80307 DRUG TEST PRSMV CHEM ANLYZR: CPT | Performed by: PHYSICIAN ASSISTANT

## 2018-06-06 PROCEDURE — 83690 ASSAY OF LIPASE: CPT | Performed by: PHYSICIAN ASSISTANT

## 2018-06-06 PROCEDURE — 25010000002 IOPAMIDOL 61 % SOLUTION: Performed by: EMERGENCY MEDICINE

## 2018-06-06 PROCEDURE — 93005 ELECTROCARDIOGRAM TRACING: CPT | Performed by: PHYSICIAN ASSISTANT

## 2018-06-06 PROCEDURE — 85610 PROTHROMBIN TIME: CPT | Performed by: PHYSICIAN ASSISTANT

## 2018-06-06 PROCEDURE — 25010000002 PROCHLORPERAZINE EDISYLATE PER 10 MG: Performed by: PHYSICIAN ASSISTANT

## 2018-06-06 PROCEDURE — 96374 THER/PROPH/DIAG INJ IV PUSH: CPT

## 2018-06-06 PROCEDURE — 74022 RADEX COMPL AQT ABD SERIES: CPT | Performed by: RADIOLOGY

## 2018-06-06 PROCEDURE — 74177 CT ABD & PELVIS W/CONTRAST: CPT | Performed by: RADIOLOGY

## 2018-06-06 PROCEDURE — 25010000002 DICYCLOMINE PER 20 MG: Performed by: PHYSICIAN ASSISTANT

## 2018-06-06 PROCEDURE — 93010 ELECTROCARDIOGRAM REPORT: CPT | Performed by: INTERNAL MEDICINE

## 2018-06-06 PROCEDURE — 96372 THER/PROPH/DIAG INJ SC/IM: CPT

## 2018-06-06 PROCEDURE — 85025 COMPLETE CBC W/AUTO DIFF WBC: CPT | Performed by: PHYSICIAN ASSISTANT

## 2018-06-06 PROCEDURE — 80053 COMPREHEN METABOLIC PANEL: CPT | Performed by: PHYSICIAN ASSISTANT

## 2018-06-06 PROCEDURE — 99284 EMERGENCY DEPT VISIT MOD MDM: CPT

## 2018-06-06 PROCEDURE — 82150 ASSAY OF AMYLASE: CPT | Performed by: PHYSICIAN ASSISTANT

## 2018-06-06 PROCEDURE — 74022 RADEX COMPL AQT ABD SERIES: CPT

## 2018-06-06 PROCEDURE — 81003 URINALYSIS AUTO W/O SCOPE: CPT | Performed by: PHYSICIAN ASSISTANT

## 2018-06-06 PROCEDURE — 84484 ASSAY OF TROPONIN QUANT: CPT | Performed by: PHYSICIAN ASSISTANT

## 2018-06-06 PROCEDURE — 74177 CT ABD & PELVIS W/CONTRAST: CPT

## 2018-06-06 RX ORDER — CIPROFLOXACIN 500 MG/1
500 TABLET, FILM COATED ORAL 2 TIMES DAILY
Qty: 14 TABLET | Refills: 0 | Status: SHIPPED | OUTPATIENT
Start: 2018-06-06 | End: 2018-06-13

## 2018-06-06 RX ORDER — DICYCLOMINE HYDROCHLORIDE 10 MG/ML
20 INJECTION INTRAMUSCULAR ONCE
Status: COMPLETED | OUTPATIENT
Start: 2018-06-06 | End: 2018-06-06

## 2018-06-06 RX ORDER — METRONIDAZOLE 500 MG/1
500 TABLET ORAL 2 TIMES DAILY
Qty: 14 TABLET | Refills: 0 | Status: SHIPPED | OUTPATIENT
Start: 2018-06-06 | End: 2018-06-13

## 2018-06-06 RX ORDER — ONDANSETRON 4 MG/1
4 TABLET, ORALLY DISINTEGRATING ORAL EVERY 6 HOURS PRN
Qty: 12 TABLET | Refills: 0 | Status: SHIPPED | OUTPATIENT
Start: 2018-06-06 | End: 2019-03-05

## 2018-06-06 RX ORDER — SODIUM CHLORIDE 0.9 % (FLUSH) 0.9 %
10 SYRINGE (ML) INJECTION AS NEEDED
Status: DISCONTINUED | OUTPATIENT
Start: 2018-06-06 | End: 2018-06-07 | Stop reason: HOSPADM

## 2018-06-06 RX ADMIN — DICYCLOMINE HYDROCHLORIDE 20 MG: 20 INJECTION, SOLUTION INTRAMUSCULAR at 20:56

## 2018-06-06 RX ADMIN — PROCHLORPERAZINE EDISYLATE 10 MG: 5 INJECTION INTRAMUSCULAR; INTRAVENOUS at 23:08

## 2018-06-06 RX ADMIN — IOPAMIDOL 100 ML: 612 INJECTION, SOLUTION INTRAVENOUS at 22:16

## 2018-06-07 NOTE — ED NOTES
Made provider aware at this time that patient spouse would like to speak with MD, Dr. Lilly entered room with RN at this time, spouse began by stating that they know what is wrong with the patient, that it is his pancreas again, states that it flares up all the time because of his stomach cancer. States that he needs to have something for pain. Dr. Lilly explained that they will help him, once the CT scan is performed, updated that all the labs are WNL and that the scan needs to be completed. Spouse sits at bedside and has nothing to say at this time.      Keira Bradley RN  06/06/18 5584

## 2018-06-07 NOTE — ED NOTES
Patient presents to the ER tonight from home due to left upper and lower quadrant abdominal pain that began 2 days ago. Patient states he has a history of stomach cancer and pancreatitis, reports that he has had ongoing abdominal issues, states that he is going to be treated at home for his stomach cancer. Patient states that his abdominal pain has worsened in the past 2 days, states that he believes his pancreatitis has flared up and giving him issues. Patient's family at bedside, patient and patient's family updated on plan of care. Patient is alert and oriented x4, respirations are even and unlabored, NADN, will continue to monitor and follow plan of care.     Irvin Lackey RN  06/06/18 4476

## 2018-06-07 NOTE — ED NOTES
Patient continues to complain of pain and request medication for pain, provider notified, new orders noted.     Irvin Lackey RN  06/06/18 2051

## 2018-06-07 NOTE — ED NOTES
Dr. Lilly at bedside at this time talking with patient about results of tests and blood work and needs for pain medication at this time.     Irvin Lackey RN  06/06/18 3056

## 2018-06-07 NOTE — ED NOTES
Patient significant other approached nurses station once again requesting something for pain, states that they want someone else besides that women. States that if no one will help him then come and take his IV out and they will go to [place] where they will get some relief. Made provider aware     Keira Bradley RN  06/06/18 9047
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
## 2018-06-07 NOTE — ED NOTES
" Patient's significant other at the nurses station at this time, states \"there's blood coming back down his IV.\" Went into room and flushed IV at this time, IV patency maintained at this time. Patient's significant other at bedside states \"can you have that male doctor come back in here, we didn't really get to talk to him since he was going to CT. He really needs something for pain, he takes percocet at home and that's not working, so that shot you gave him isn't going to help.\" Explained to patient and family results of tests and that we're awaiting results of CT scan, understanding verbalized.     Irvin Lackey RN  06/06/18 7154    "

## 2018-06-07 NOTE — ED NOTES
Patient states that he's hurting at this time and requests something for pain. Provider notified at this time.     Irvin Lackey RN  06/06/18 2043

## 2019-11-19 ENCOUNTER — TRANSCRIBE ORDERS (OUTPATIENT)
Dept: ADMINISTRATIVE | Facility: HOSPITAL | Age: 39
End: 2019-11-19

## 2019-11-19 DIAGNOSIS — M54.9 BACK PAIN, UNSPECIFIED BACK LOCATION, UNSPECIFIED BACK PAIN LATERALITY, UNSPECIFIED CHRONICITY: ICD-10-CM

## 2019-11-19 DIAGNOSIS — K85.90 PANCREATITIS, UNSPECIFIED PANCREATITIS TYPE: Primary | ICD-10-CM

## 2019-11-22 ENCOUNTER — HOSPITAL ENCOUNTER (OUTPATIENT)
Dept: MRI IMAGING | Facility: HOSPITAL | Age: 39
Discharge: HOME | End: 2019-11-22
Admitting: FAMILY MEDICINE

## 2019-11-22 DIAGNOSIS — M54.9 BACK PAIN, UNSPECIFIED BACK LOCATION, UNSPECIFIED BACK PAIN LATERALITY, UNSPECIFIED CHRONICITY: ICD-10-CM

## 2019-11-22 DIAGNOSIS — K85.90 PANCREATITIS, UNSPECIFIED PANCREATITIS TYPE: ICD-10-CM

## 2019-11-22 PROCEDURE — 72148 MRI LUMBAR SPINE W/O DYE: CPT

## 2019-11-22 PROCEDURE — 72148 MRI LUMBAR SPINE W/O DYE: CPT | Performed by: RADIOLOGY
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
## 2020-07-15 ENCOUNTER — HOSPITAL ENCOUNTER (EMERGENCY)
Facility: HOSPITAL | Age: 40
Discharge: HOME | End: 2020-07-15
Attending: FAMILY MEDICINE | Admitting: FAMILY MEDICINE

## 2020-07-15 ENCOUNTER — APPOINTMENT (OUTPATIENT)
Dept: CT IMAGING | Facility: HOSPITAL | Age: 40
End: 2020-07-15

## 2020-07-15 VITALS
BODY MASS INDEX: 16.94 KG/M2 | WEIGHT: 121 LBS | SYSTOLIC BLOOD PRESSURE: 98 MMHG | TEMPERATURE: 97.7 F | OXYGEN SATURATION: 97 % | HEIGHT: 71 IN | RESPIRATION RATE: 18 BRPM | DIASTOLIC BLOOD PRESSURE: 77 MMHG | HEART RATE: 72 BPM

## 2020-07-15 DIAGNOSIS — R10.12 LEFT UPPER QUADRANT ABDOMINAL PAIN: Primary | ICD-10-CM

## 2020-07-15 LAB
ALBUMIN SERPL-MCNC: 3.56 G/DL (ref 3.5–5.2)
ALBUMIN/GLOB SERPL: 1.3 G/DL
ALP SERPL-CCNC: 127 U/L (ref 39–117)
ALT SERPL W P-5'-P-CCNC: 11 U/L (ref 1–41)
ANION GAP SERPL CALCULATED.3IONS-SCNC: 11.3 MMOL/L (ref 5–15)
AST SERPL-CCNC: 20 U/L (ref 1–40)
BILIRUB SERPL-MCNC: 0.4 MG/DL (ref 0–1.2)
BILIRUB UR QL STRIP: NEGATIVE
BUN SERPL-MCNC: 2 MG/DL (ref 6–20)
BUN/CREAT SERPL: 3.1 (ref 7–25)
CALCIUM SPEC-SCNC: 9 MG/DL (ref 8.6–10.5)
CHLORIDE SERPL-SCNC: 99 MMOL/L (ref 98–107)
CLARITY UR: CLEAR
CO2 SERPL-SCNC: 28.7 MMOL/L (ref 22–29)
COLOR UR: YELLOW
CREAT SERPL-MCNC: 0.64 MG/DL (ref 0.76–1.27)
CRP SERPL-MCNC: 0.53 MG/DL (ref 0–0.5)
DEPRECATED RDW RBC AUTO: 44.5 FL (ref 37–54)
EOSINOPHIL # BLD MANUAL: 0.44 10*3/MM3 (ref 0–0.4)
EOSINOPHIL NFR BLD MANUAL: 4 % (ref 0.3–6.2)
ERYTHROCYTE [DISTWIDTH] IN BLOOD BY AUTOMATED COUNT: 13.6 % (ref 12.3–15.4)
GFR SERPL CREATININE-BSD FRML MDRD: 138 ML/MIN/1.73
GLOBULIN UR ELPH-MCNC: 2.8 GM/DL
GLUCOSE SERPL-MCNC: 129 MG/DL (ref 65–99)
GLUCOSE UR STRIP-MCNC: NEGATIVE MG/DL
HCT VFR BLD AUTO: 47.5 % (ref 37.5–51)
HGB BLD-MCNC: 15.5 G/DL (ref 13–17.7)
HGB UR QL STRIP.AUTO: NEGATIVE
HOLD SPECIMEN: NORMAL
HOLD SPECIMEN: NORMAL
HYPOCHROMIA BLD QL: ABNORMAL
KETONES UR QL STRIP: NEGATIVE
LEUKOCYTE ESTERASE UR QL STRIP.AUTO: NEGATIVE
LIPASE SERPL-CCNC: 6 U/L (ref 13–60)
LYMPHOCYTES # BLD MANUAL: 4.37 10*3/MM3 (ref 0.7–3.1)
LYMPHOCYTES NFR BLD MANUAL: 13 % (ref 5–12)
LYMPHOCYTES NFR BLD MANUAL: 40 % (ref 19.6–45.3)
MAGNESIUM SERPL-MCNC: 2 MG/DL (ref 1.6–2.6)
MCH RBC QN AUTO: 28.6 PG (ref 26.6–33)
MCHC RBC AUTO-ENTMCNC: 32.6 G/DL (ref 31.5–35.7)
MCV RBC AUTO: 87.6 FL (ref 79–97)
MONOCYTES # BLD AUTO: 1.42 10*3/MM3 (ref 0.1–0.9)
NEUTROPHILS # BLD AUTO: 4.7 10*3/MM3 (ref 1.7–7)
NEUTROPHILS NFR BLD MANUAL: 43 % (ref 42.7–76)
NITRITE UR QL STRIP: NEGATIVE
PH UR STRIP.AUTO: 7 [PH] (ref 5–8)
PLAT MORPH BLD: NORMAL
PLATELET # BLD AUTO: 302 10*3/MM3 (ref 140–450)
PMV BLD AUTO: 10.5 FL (ref 6–12)
POTASSIUM SERPL-SCNC: 3.3 MMOL/L (ref 3.5–5.2)
PROT SERPL-MCNC: 6.4 G/DL (ref 6–8.5)
PROT UR QL STRIP: NEGATIVE
RBC # BLD AUTO: 5.42 10*6/MM3 (ref 4.14–5.8)
SCAN SLIDE: NORMAL
SODIUM SERPL-SCNC: 139 MMOL/L (ref 136–145)
SP GR UR STRIP: <=1.005 (ref 1–1.03)
UROBILINOGEN UR QL STRIP: NORMAL
WBC # BLD AUTO: 10.92 10*3/MM3 (ref 3.4–10.8)
WHOLE BLOOD HOLD SPECIMEN: NORMAL
WHOLE BLOOD HOLD SPECIMEN: NORMAL

## 2020-07-15 PROCEDURE — 86140 C-REACTIVE PROTEIN: CPT | Performed by: PHYSICIAN ASSISTANT

## 2020-07-15 PROCEDURE — 80053 COMPREHEN METABOLIC PANEL: CPT | Performed by: PHYSICIAN ASSISTANT

## 2020-07-15 PROCEDURE — 25010000002 ONDANSETRON PER 1 MG: Performed by: PHYSICIAN ASSISTANT

## 2020-07-15 PROCEDURE — 83735 ASSAY OF MAGNESIUM: CPT | Performed by: PHYSICIAN ASSISTANT

## 2020-07-15 PROCEDURE — 85007 BL SMEAR W/DIFF WBC COUNT: CPT | Performed by: PHYSICIAN ASSISTANT

## 2020-07-15 PROCEDURE — 96374 THER/PROPH/DIAG INJ IV PUSH: CPT

## 2020-07-15 PROCEDURE — 0 IOVERSOL 68 % SOLUTION: Performed by: FAMILY MEDICINE

## 2020-07-15 PROCEDURE — 74177 CT ABD & PELVIS W/CONTRAST: CPT | Performed by: RADIOLOGY

## 2020-07-15 PROCEDURE — 74177 CT ABD & PELVIS W/CONTRAST: CPT

## 2020-07-15 PROCEDURE — 25010000002 PROCHLORPERAZINE 10 MG/2ML SOLUTION: Performed by: PHYSICIAN ASSISTANT

## 2020-07-15 PROCEDURE — 81003 URINALYSIS AUTO W/O SCOPE: CPT | Performed by: PHYSICIAN ASSISTANT

## 2020-07-15 PROCEDURE — 96375 TX/PRO/DX INJ NEW DRUG ADDON: CPT

## 2020-07-15 PROCEDURE — 85025 COMPLETE CBC W/AUTO DIFF WBC: CPT | Performed by: PHYSICIAN ASSISTANT

## 2020-07-15 PROCEDURE — 83690 ASSAY OF LIPASE: CPT | Performed by: PHYSICIAN ASSISTANT

## 2020-07-15 PROCEDURE — 99284 EMERGENCY DEPT VISIT MOD MDM: CPT

## 2020-07-15 RX ORDER — SODIUM CHLORIDE 0.9 % (FLUSH) 0.9 %
10 SYRINGE (ML) INJECTION AS NEEDED
Status: DISCONTINUED | OUTPATIENT
Start: 2020-07-15 | End: 2020-07-15 | Stop reason: HOSPADM

## 2020-07-15 RX ORDER — ONDANSETRON 4 MG/1
4 TABLET, ORALLY DISINTEGRATING ORAL EVERY 6 HOURS PRN
Qty: 20 TABLET | Refills: 0 | Status: SHIPPED | OUTPATIENT
Start: 2020-07-15

## 2020-07-15 RX ORDER — ONDANSETRON 2 MG/ML
4 INJECTION INTRAMUSCULAR; INTRAVENOUS ONCE
Status: COMPLETED | OUTPATIENT
Start: 2020-07-15 | End: 2020-07-15

## 2020-07-15 RX ORDER — PROCHLORPERAZINE EDISYLATE 5 MG/ML
10 INJECTION INTRAMUSCULAR; INTRAVENOUS ONCE
Status: COMPLETED | OUTPATIENT
Start: 2020-07-15 | End: 2020-07-15

## 2020-07-15 RX ADMIN — ONDANSETRON 4 MG: 2 INJECTION INTRAMUSCULAR; INTRAVENOUS at 15:57

## 2020-07-15 RX ADMIN — IOVERSOL 100 ML: 678 INJECTION INTRA-ARTERIAL; INTRAVENOUS at 18:11

## 2020-07-15 RX ADMIN — PROCHLORPERAZINE EDISYLATE 10 MG: 5 INJECTION INTRAMUSCULAR; INTRAVENOUS at 16:34

## 2020-07-15 RX ADMIN — SODIUM CHLORIDE 1000 ML: 9 INJECTION, SOLUTION INTRAVENOUS at 15:57

## 2020-07-15 NOTE — ED NOTES
Discharge instructions reviewed with patient. Patient instructed to follow up with PCP (Dr. Cates) for further evaluation. Patient instructed to return to ED if symptoms worsen or if any new problems arise. Patient verbalizes understanding of discharge instructions, patient ambulatory out of ED. No acute distress noted.     Maria Del Carmen Giang, DEVIN  07/15/20 3543

## 2020-07-15 NOTE — ED NOTES
IV contrast consent form signed and placed on chart.      Maria Del Carmen Giang RN  07/15/20 2647
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
## 2021-04-01 ENCOUNTER — APPOINTMENT (OUTPATIENT)
Dept: CT IMAGING | Facility: HOSPITAL | Age: 41
End: 2021-04-01

## 2021-04-01 ENCOUNTER — APPOINTMENT (OUTPATIENT)
Dept: GENERAL RADIOLOGY | Facility: HOSPITAL | Age: 41
End: 2021-04-01

## 2021-04-01 ENCOUNTER — HOSPITAL ENCOUNTER (EMERGENCY)
Facility: HOSPITAL | Age: 41
Discharge: HOME | End: 2021-04-01
Attending: STUDENT IN AN ORGANIZED HEALTH CARE EDUCATION/TRAINING PROGRAM | Admitting: STUDENT IN AN ORGANIZED HEALTH CARE EDUCATION/TRAINING PROGRAM

## 2021-04-01 VITALS
RESPIRATION RATE: 18 BRPM | DIASTOLIC BLOOD PRESSURE: 61 MMHG | HEIGHT: 71 IN | WEIGHT: 114 LBS | BODY MASS INDEX: 15.96 KG/M2 | SYSTOLIC BLOOD PRESSURE: 112 MMHG | HEART RATE: 75 BPM | OXYGEN SATURATION: 98 % | TEMPERATURE: 98.7 F

## 2021-04-01 DIAGNOSIS — J44.1 COPD EXACERBATION (HCC): Primary | ICD-10-CM

## 2021-04-01 DIAGNOSIS — J84.10 PULMONARY FIBROSIS (HCC): ICD-10-CM

## 2021-04-01 LAB
6-ACETYL MORPHINE: NEGATIVE
ALBUMIN SERPL-MCNC: 3.53 G/DL (ref 3.5–5.2)
ALBUMIN/GLOB SERPL: 1.3 G/DL
ALP SERPL-CCNC: 115 U/L (ref 39–117)
ALT SERPL W P-5'-P-CCNC: 17 U/L (ref 1–41)
AMPHET+METHAMPHET UR QL: NEGATIVE
ANION GAP SERPL CALCULATED.3IONS-SCNC: 13.3 MMOL/L (ref 5–15)
APAP SERPL-MCNC: <5 MCG/ML (ref 0–30)
AST SERPL-CCNC: 17 U/L (ref 1–40)
BARBITURATES UR QL SCN: NEGATIVE
BASOPHILS # BLD MANUAL: 0.35 10*3/MM3 (ref 0–0.2)
BASOPHILS NFR BLD AUTO: 2 % (ref 0–1.5)
BENZODIAZ UR QL SCN: NEGATIVE
BILIRUB SERPL-MCNC: 0.5 MG/DL (ref 0–1.2)
BILIRUB UR QL STRIP: NEGATIVE
BUN SERPL-MCNC: 4 MG/DL (ref 6–20)
BUN/CREAT SERPL: 7.1 (ref 7–25)
BUPRENORPHINE SERPL-MCNC: NEGATIVE NG/ML
CALCIUM SPEC-SCNC: 9.2 MG/DL (ref 8.6–10.5)
CANNABINOIDS SERPL QL: NEGATIVE
CHLORIDE SERPL-SCNC: 102 MMOL/L (ref 98–107)
CLARITY UR: CLEAR
CO2 SERPL-SCNC: 25.7 MMOL/L (ref 22–29)
COCAINE UR QL: NEGATIVE
COLOR UR: YELLOW
CREAT SERPL-MCNC: 0.56 MG/DL (ref 0.76–1.27)
CRP SERPL-MCNC: <0.3 MG/DL (ref 0–0.5)
D-LACTATE SERPL-SCNC: 2.1 MMOL/L (ref 0.5–2)
DEPRECATED RDW RBC AUTO: 41.6 FL (ref 37–54)
EOSINOPHIL # BLD MANUAL: 0.35 10*3/MM3 (ref 0–0.4)
EOSINOPHIL NFR BLD MANUAL: 2 % (ref 0.3–6.2)
ERYTHROCYTE [DISTWIDTH] IN BLOOD BY AUTOMATED COUNT: 13.3 % (ref 12.3–15.4)
ERYTHROCYTE [SEDIMENTATION RATE] IN BLOOD: 7 MM/HR (ref 0–15)
ETHANOL BLD-MCNC: <10 MG/DL (ref 0–10)
ETHANOL UR QL: <0.01 %
FLUAV RNA RESP QL NAA+PROBE: NOT DETECTED
FLUBV RNA RESP QL NAA+PROBE: NOT DETECTED
GFR SERPL CREATININE-BSD FRML MDRD: >150 ML/MIN/1.73
GLOBULIN UR ELPH-MCNC: 2.8 GM/DL
GLUCOSE SERPL-MCNC: 106 MG/DL (ref 65–99)
GLUCOSE UR STRIP-MCNC: NEGATIVE MG/DL
HCT VFR BLD AUTO: 45.9 % (ref 37.5–51)
HGB BLD-MCNC: 15.3 G/DL (ref 13–17.7)
HGB UR QL STRIP.AUTO: NEGATIVE
INR PPP: 1.21 (ref 0.9–1.1)
KETONES UR QL STRIP: NEGATIVE
LEUKOCYTE ESTERASE UR QL STRIP.AUTO: NEGATIVE
LIPASE SERPL-CCNC: 10 U/L (ref 13–60)
LYMPHOCYTES # BLD MANUAL: 6.94 10*3/MM3 (ref 0.7–3.1)
LYMPHOCYTES NFR BLD MANUAL: 4 % (ref 5–12)
LYMPHOCYTES NFR BLD MANUAL: 40 % (ref 19.6–45.3)
MAGNESIUM SERPL-MCNC: 1.6 MG/DL (ref 1.6–2.6)
MCH RBC QN AUTO: 28.6 PG (ref 26.6–33)
MCHC RBC AUTO-ENTMCNC: 33.3 G/DL (ref 31.5–35.7)
MCV RBC AUTO: 85.8 FL (ref 79–97)
METHADONE UR QL SCN: NEGATIVE
MONOCYTES # BLD AUTO: 0.69 10*3/MM3 (ref 0.1–0.9)
NEUTROPHILS # BLD AUTO: 9.02 10*3/MM3 (ref 1.7–7)
NEUTROPHILS NFR BLD MANUAL: 52 % (ref 42.7–76)
NITRITE UR QL STRIP: NEGATIVE
NT-PROBNP SERPL-MCNC: 56.6 PG/ML (ref 0–450)
OPIATES UR QL: POSITIVE
OXYCODONE UR QL SCN: NEGATIVE
PCP UR QL SCN: NEGATIVE
PH UR STRIP.AUTO: >=9 [PH] (ref 5–8)
PHOSPHATE SERPL-MCNC: 1.2 MG/DL (ref 2.5–4.5)
PLAT MORPH BLD: NORMAL
PLATELET # BLD AUTO: 345 10*3/MM3 (ref 140–450)
PMV BLD AUTO: 10.6 FL (ref 6–12)
POTASSIUM SERPL-SCNC: 3 MMOL/L (ref 3.5–5.2)
PROT SERPL-MCNC: 6.3 G/DL (ref 6–8.5)
PROT UR QL STRIP: NEGATIVE
PROTHROMBIN TIME: 15.1 SECONDS (ref 11.9–14.1)
QT INTERVAL: 408 MS
QTC INTERVAL: 446 MS
RBC # BLD AUTO: 5.35 10*6/MM3 (ref 4.14–5.8)
RBC MORPH BLD: NORMAL
SALICYLATES SERPL-MCNC: <0.3 MG/DL
SARS-COV-2 RNA RESP QL NAA+PROBE: NOT DETECTED
SCAN SLIDE: NORMAL
SODIUM SERPL-SCNC: 141 MMOL/L (ref 136–145)
SP GR UR STRIP: 1.01 (ref 1–1.03)
TROPONIN T SERPL-MCNC: <0.01 NG/ML (ref 0–0.03)
TSH SERPL DL<=0.05 MIU/L-ACNC: 1.91 UIU/ML (ref 0.27–4.2)
UROBILINOGEN UR QL STRIP: ABNORMAL
WBC # BLD AUTO: 17.35 10*3/MM3 (ref 3.4–10.8)

## 2021-04-01 PROCEDURE — 80143 DRUG ASSAY ACETAMINOPHEN: CPT | Performed by: STUDENT IN AN ORGANIZED HEALTH CARE EDUCATION/TRAINING PROGRAM

## 2021-04-01 PROCEDURE — 84484 ASSAY OF TROPONIN QUANT: CPT | Performed by: STUDENT IN AN ORGANIZED HEALTH CARE EDUCATION/TRAINING PROGRAM

## 2021-04-01 PROCEDURE — 83690 ASSAY OF LIPASE: CPT | Performed by: STUDENT IN AN ORGANIZED HEALTH CARE EDUCATION/TRAINING PROGRAM

## 2021-04-01 PROCEDURE — 71250 CT THORAX DX C-: CPT

## 2021-04-01 PROCEDURE — 36415 COLL VENOUS BLD VENIPUNCTURE: CPT

## 2021-04-01 PROCEDURE — 74176 CT ABD & PELVIS W/O CONTRAST: CPT

## 2021-04-01 PROCEDURE — 87040 BLOOD CULTURE FOR BACTERIA: CPT | Performed by: STUDENT IN AN ORGANIZED HEALTH CARE EDUCATION/TRAINING PROGRAM

## 2021-04-01 PROCEDURE — 80307 DRUG TEST PRSMV CHEM ANLYZR: CPT | Performed by: STUDENT IN AN ORGANIZED HEALTH CARE EDUCATION/TRAINING PROGRAM

## 2021-04-01 PROCEDURE — 25010000002 MORPHINE PER 10 MG: Performed by: STUDENT IN AN ORGANIZED HEALTH CARE EDUCATION/TRAINING PROGRAM

## 2021-04-01 PROCEDURE — 83605 ASSAY OF LACTIC ACID: CPT | Performed by: STUDENT IN AN ORGANIZED HEALTH CARE EDUCATION/TRAINING PROGRAM

## 2021-04-01 PROCEDURE — 93010 ELECTROCARDIOGRAM REPORT: CPT | Performed by: INTERNAL MEDICINE

## 2021-04-01 PROCEDURE — 84100 ASSAY OF PHOSPHORUS: CPT | Performed by: STUDENT IN AN ORGANIZED HEALTH CARE EDUCATION/TRAINING PROGRAM

## 2021-04-01 PROCEDURE — 81003 URINALYSIS AUTO W/O SCOPE: CPT | Performed by: STUDENT IN AN ORGANIZED HEALTH CARE EDUCATION/TRAINING PROGRAM

## 2021-04-01 PROCEDURE — 82077 ASSAY SPEC XCP UR&BREATH IA: CPT | Performed by: STUDENT IN AN ORGANIZED HEALTH CARE EDUCATION/TRAINING PROGRAM

## 2021-04-01 PROCEDURE — 85610 PROTHROMBIN TIME: CPT | Performed by: STUDENT IN AN ORGANIZED HEALTH CARE EDUCATION/TRAINING PROGRAM

## 2021-04-01 PROCEDURE — 71045 X-RAY EXAM CHEST 1 VIEW: CPT

## 2021-04-01 PROCEDURE — 80179 DRUG ASSAY SALICYLATE: CPT | Performed by: STUDENT IN AN ORGANIZED HEALTH CARE EDUCATION/TRAINING PROGRAM

## 2021-04-01 PROCEDURE — 93005 ELECTROCARDIOGRAM TRACING: CPT | Performed by: STUDENT IN AN ORGANIZED HEALTH CARE EDUCATION/TRAINING PROGRAM

## 2021-04-01 PROCEDURE — 96374 THER/PROPH/DIAG INJ IV PUSH: CPT

## 2021-04-01 PROCEDURE — 87636 SARSCOV2 & INF A&B AMP PRB: CPT | Performed by: STUDENT IN AN ORGANIZED HEALTH CARE EDUCATION/TRAINING PROGRAM

## 2021-04-01 PROCEDURE — 85652 RBC SED RATE AUTOMATED: CPT | Performed by: STUDENT IN AN ORGANIZED HEALTH CARE EDUCATION/TRAINING PROGRAM

## 2021-04-01 PROCEDURE — 99284 EMERGENCY DEPT VISIT MOD MDM: CPT

## 2021-04-01 PROCEDURE — 86140 C-REACTIVE PROTEIN: CPT | Performed by: STUDENT IN AN ORGANIZED HEALTH CARE EDUCATION/TRAINING PROGRAM

## 2021-04-01 PROCEDURE — 83880 ASSAY OF NATRIURETIC PEPTIDE: CPT | Performed by: STUDENT IN AN ORGANIZED HEALTH CARE EDUCATION/TRAINING PROGRAM

## 2021-04-01 PROCEDURE — 25010000002 ONDANSETRON PER 1 MG: Performed by: STUDENT IN AN ORGANIZED HEALTH CARE EDUCATION/TRAINING PROGRAM

## 2021-04-01 PROCEDURE — 85007 BL SMEAR W/DIFF WBC COUNT: CPT | Performed by: STUDENT IN AN ORGANIZED HEALTH CARE EDUCATION/TRAINING PROGRAM

## 2021-04-01 PROCEDURE — 83735 ASSAY OF MAGNESIUM: CPT | Performed by: STUDENT IN AN ORGANIZED HEALTH CARE EDUCATION/TRAINING PROGRAM

## 2021-04-01 PROCEDURE — 80050 GENERAL HEALTH PANEL: CPT | Performed by: STUDENT IN AN ORGANIZED HEALTH CARE EDUCATION/TRAINING PROGRAM

## 2021-04-01 PROCEDURE — 96375 TX/PRO/DX INJ NEW DRUG ADDON: CPT

## 2021-04-01 RX ORDER — SODIUM CHLORIDE 0.9 % (FLUSH) 0.9 %
10 SYRINGE (ML) INJECTION AS NEEDED
Status: DISCONTINUED | OUTPATIENT
Start: 2021-04-01 | End: 2021-04-01 | Stop reason: HOSPADM

## 2021-04-01 RX ORDER — POTASSIUM CHLORIDE 20 MEQ/1
40 TABLET, EXTENDED RELEASE ORAL ONCE
Status: COMPLETED | OUTPATIENT
Start: 2021-04-01 | End: 2021-04-01

## 2021-04-01 RX ORDER — PREDNISONE 20 MG/1
40 TABLET ORAL DAILY
Qty: 10 TABLET | Refills: 0 | Status: SHIPPED | OUTPATIENT
Start: 2021-04-01 | End: 2022-05-31

## 2021-04-01 RX ORDER — DOXYCYCLINE 100 MG/1
100 CAPSULE ORAL 2 TIMES DAILY
Qty: 10 CAPSULE | Refills: 0 | Status: SHIPPED | OUTPATIENT
Start: 2021-04-01 | End: 2022-05-31

## 2021-04-01 RX ORDER — ONDANSETRON 2 MG/ML
4 INJECTION INTRAMUSCULAR; INTRAVENOUS ONCE
Status: COMPLETED | OUTPATIENT
Start: 2021-04-01 | End: 2021-04-01

## 2021-04-01 RX ADMIN — SODIUM CHLORIDE 1000 ML: 9 INJECTION, SOLUTION INTRAVENOUS at 03:13

## 2021-04-01 RX ADMIN — MORPHINE SULFATE 4 MG: 4 INJECTION, SOLUTION INTRAMUSCULAR; INTRAVENOUS at 03:13

## 2021-04-01 RX ADMIN — ONDANSETRON 4 MG: 2 INJECTION INTRAMUSCULAR; INTRAVENOUS at 03:13

## 2021-04-01 RX ADMIN — POTASSIUM CHLORIDE 40 MEQ: 1500 TABLET, EXTENDED RELEASE ORAL at 05:17

## 2021-04-01 NOTE — DISCHARGE INSTRUCTIONS
Take all antibiotics and steroids.  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  You may need to be referred to a pulmonologist in the near future.  Return the emergency department if you have any worsening symptoms.
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
## 2021-04-06 LAB
BACTERIA SPEC AEROBE CULT: NORMAL
BACTERIA SPEC AEROBE CULT: NORMAL

## 2022-05-21 ENCOUNTER — APPOINTMENT (OUTPATIENT)
Dept: CT IMAGING | Facility: HOSPITAL | Age: 42
End: 2022-05-21

## 2022-05-21 ENCOUNTER — APPOINTMENT (OUTPATIENT)
Dept: ULTRASOUND IMAGING | Facility: HOSPITAL | Age: 42
End: 2022-05-21

## 2022-05-21 ENCOUNTER — HOSPITAL ENCOUNTER (EMERGENCY)
Facility: HOSPITAL | Age: 42
Discharge: HOME | End: 2022-05-21
Attending: EMERGENCY MEDICINE | Admitting: EMERGENCY MEDICINE

## 2022-05-21 VITALS
OXYGEN SATURATION: 98 % | SYSTOLIC BLOOD PRESSURE: 128 MMHG | HEART RATE: 87 BPM | DIASTOLIC BLOOD PRESSURE: 80 MMHG | RESPIRATION RATE: 16 BRPM | TEMPERATURE: 98 F | WEIGHT: 115 LBS | HEIGHT: 71 IN | BODY MASS INDEX: 16.1 KG/M2

## 2022-05-21 DIAGNOSIS — M43.17 SPONDYLOLISTHESIS AT L5-S1 LEVEL: Primary | ICD-10-CM

## 2022-05-21 DIAGNOSIS — E87.6 HYPOKALEMIA: ICD-10-CM

## 2022-05-21 LAB
ALBUMIN SERPL-MCNC: 3.03 G/DL (ref 3.5–5.2)
ALBUMIN/GLOB SERPL: 1.2 G/DL
ALP SERPL-CCNC: 157 U/L (ref 39–117)
ALT SERPL W P-5'-P-CCNC: 10 U/L (ref 1–41)
ANION GAP SERPL CALCULATED.3IONS-SCNC: 7.9 MMOL/L (ref 5–15)
AST SERPL-CCNC: 18 U/L (ref 1–40)
BASOPHILS # BLD AUTO: 0.1 10*3/MM3 (ref 0–0.2)
BASOPHILS NFR BLD AUTO: 0.9 % (ref 0–1.5)
BILIRUB SERPL-MCNC: 0.4 MG/DL (ref 0–1.2)
BUN SERPL-MCNC: 2 MG/DL (ref 6–20)
BUN/CREAT SERPL: 4 (ref 7–25)
CALCIUM SPEC-SCNC: 8.7 MG/DL (ref 8.6–10.5)
CHLORIDE SERPL-SCNC: 99 MMOL/L (ref 98–107)
CO2 SERPL-SCNC: 32.1 MMOL/L (ref 22–29)
CREAT SERPL-MCNC: 0.5 MG/DL (ref 0.76–1.27)
CRP SERPL-MCNC: <0.3 MG/DL (ref 0–0.5)
DEPRECATED RDW RBC AUTO: 43.4 FL (ref 37–54)
EGFRCR SERPLBLD CKD-EPI 2021: 129.8 ML/MIN/1.73
EOSINOPHIL # BLD AUTO: 0.56 10*3/MM3 (ref 0–0.4)
EOSINOPHIL NFR BLD AUTO: 5.2 % (ref 0.3–6.2)
ERYTHROCYTE [DISTWIDTH] IN BLOOD BY AUTOMATED COUNT: 13.5 % (ref 12.3–15.4)
ERYTHROCYTE [SEDIMENTATION RATE] IN BLOOD: 6 MM/HR (ref 0–15)
GLOBULIN UR ELPH-MCNC: 2.6 GM/DL
GLUCOSE SERPL-MCNC: 94 MG/DL (ref 65–99)
HCT VFR BLD AUTO: 40.1 % (ref 37.5–51)
HGB BLD-MCNC: 13.5 G/DL (ref 13–17.7)
HOLD SPECIMEN: NORMAL
HOLD SPECIMEN: NORMAL
IMM GRANULOCYTES # BLD AUTO: 0.02 10*3/MM3 (ref 0–0.05)
IMM GRANULOCYTES NFR BLD AUTO: 0.2 % (ref 0–0.5)
LYMPHOCYTES # BLD AUTO: 4 10*3/MM3 (ref 0.7–3.1)
LYMPHOCYTES NFR BLD AUTO: 36.9 % (ref 19.6–45.3)
MCH RBC QN AUTO: 29.6 PG (ref 26.6–33)
MCHC RBC AUTO-ENTMCNC: 33.7 G/DL (ref 31.5–35.7)
MCV RBC AUTO: 87.9 FL (ref 79–97)
MONOCYTES # BLD AUTO: 0.72 10*3/MM3 (ref 0.1–0.9)
MONOCYTES NFR BLD AUTO: 6.6 % (ref 5–12)
NEUTROPHILS NFR BLD AUTO: 5.45 10*3/MM3 (ref 1.7–7)
NEUTROPHILS NFR BLD AUTO: 50.2 % (ref 42.7–76)
NRBC BLD AUTO-RTO: 0 /100 WBC (ref 0–0.2)
NT-PROBNP SERPL-MCNC: 42.5 PG/ML (ref 0–450)
PLATELET # BLD AUTO: 250 10*3/MM3 (ref 140–450)
PMV BLD AUTO: 10.3 FL (ref 6–12)
POTASSIUM SERPL-SCNC: 3.2 MMOL/L (ref 3.5–5.2)
PROT SERPL-MCNC: 5.6 G/DL (ref 6–8.5)
RBC # BLD AUTO: 4.56 10*6/MM3 (ref 4.14–5.8)
SODIUM SERPL-SCNC: 139 MMOL/L (ref 136–145)
WBC NRBC COR # BLD: 10.85 10*3/MM3 (ref 3.4–10.8)
WHOLE BLOOD HOLD COAG: NORMAL
WHOLE BLOOD HOLD SPECIMEN: NORMAL

## 2022-05-21 PROCEDURE — 85652 RBC SED RATE AUTOMATED: CPT | Performed by: PHYSICIAN ASSISTANT

## 2022-05-21 PROCEDURE — 36415 COLL VENOUS BLD VENIPUNCTURE: CPT

## 2022-05-21 PROCEDURE — 80053 COMPREHEN METABOLIC PANEL: CPT | Performed by: PHYSICIAN ASSISTANT

## 2022-05-21 PROCEDURE — 96372 THER/PROPH/DIAG INJ SC/IM: CPT

## 2022-05-21 PROCEDURE — 85025 COMPLETE CBC W/AUTO DIFF WBC: CPT | Performed by: PHYSICIAN ASSISTANT

## 2022-05-21 PROCEDURE — 25010000002 METHYLPREDNISOLONE PER 125 MG: Performed by: EMERGENCY MEDICINE

## 2022-05-21 PROCEDURE — 93970 EXTREMITY STUDY: CPT

## 2022-05-21 PROCEDURE — 83880 ASSAY OF NATRIURETIC PEPTIDE: CPT | Performed by: PHYSICIAN ASSISTANT

## 2022-05-21 PROCEDURE — 86140 C-REACTIVE PROTEIN: CPT | Performed by: PHYSICIAN ASSISTANT

## 2022-05-21 PROCEDURE — 72131 CT LUMBAR SPINE W/O DYE: CPT

## 2022-05-21 PROCEDURE — 99283 EMERGENCY DEPT VISIT LOW MDM: CPT

## 2022-05-21 RX ORDER — POTASSIUM CHLORIDE 20 MEQ/1
20 TABLET, EXTENDED RELEASE ORAL DAILY
Qty: 3 TABLET | Refills: 0 | Status: SHIPPED | OUTPATIENT
Start: 2022-05-21 | End: 2022-05-31

## 2022-05-21 RX ORDER — HYDROCODONE BITARTRATE AND ACETAMINOPHEN 10; 325 MG/1; MG/1
1 TABLET ORAL ONCE
Status: COMPLETED | OUTPATIENT
Start: 2022-05-21 | End: 2022-05-21

## 2022-05-21 RX ORDER — ORPHENADRINE CITRATE 100 MG/1
100 TABLET, EXTENDED RELEASE ORAL 2 TIMES DAILY PRN
Qty: 14 TABLET | Refills: 0 | Status: SHIPPED | OUTPATIENT
Start: 2022-05-21

## 2022-05-21 RX ORDER — POTASSIUM CHLORIDE 20 MEQ/1
40 TABLET, EXTENDED RELEASE ORAL ONCE
Status: COMPLETED | OUTPATIENT
Start: 2022-05-21 | End: 2022-05-21

## 2022-05-21 RX ORDER — METHYLPREDNISOLONE 4 MG/1
TABLET ORAL
Qty: 21 TABLET | Refills: 0 | Status: SHIPPED | OUTPATIENT
Start: 2022-05-21 | End: 2022-05-31

## 2022-05-21 RX ORDER — METHYLPREDNISOLONE SODIUM SUCCINATE 125 MG/2ML
80 INJECTION, POWDER, LYOPHILIZED, FOR SOLUTION INTRAMUSCULAR; INTRAVENOUS ONCE
Status: COMPLETED | OUTPATIENT
Start: 2022-05-21 | End: 2022-05-21

## 2022-05-21 RX ADMIN — HYDROCODONE BITARTRATE AND ACETAMINOPHEN 1 TABLET: 10; 325 TABLET ORAL at 19:35

## 2022-05-21 RX ADMIN — POTASSIUM CHLORIDE 40 MEQ: 20 TABLET, EXTENDED RELEASE ORAL at 19:34

## 2022-05-21 RX ADMIN — METHYLPREDNISOLONE SODIUM SUCCINATE 80 MG: 125 INJECTION, POWDER, FOR SOLUTION INTRAMUSCULAR; INTRAVENOUS at 19:35
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
## 2022-05-31 ENCOUNTER — OFFICE VISIT (OUTPATIENT)
Dept: PULMONOLOGY | Facility: CLINIC | Age: 42
End: 2022-05-31

## 2022-05-31 VITALS
HEIGHT: 71 IN | TEMPERATURE: 98 F | SYSTOLIC BLOOD PRESSURE: 100 MMHG | HEART RATE: 83 BPM | WEIGHT: 115 LBS | BODY MASS INDEX: 16.1 KG/M2 | DIASTOLIC BLOOD PRESSURE: 78 MMHG | OXYGEN SATURATION: 98 %

## 2022-05-31 DIAGNOSIS — E46 PROTEIN-CALORIE MALNUTRITION, UNSPECIFIED SEVERITY: ICD-10-CM

## 2022-05-31 DIAGNOSIS — F17.210 CIGARETTE NICOTINE DEPENDENCE WITHOUT COMPLICATION: ICD-10-CM

## 2022-05-31 DIAGNOSIS — J43.2 CENTRILOBULAR EMPHYSEMA: Primary | ICD-10-CM

## 2022-05-31 PROCEDURE — 94664 DEMO&/EVAL PT USE INHALER: CPT | Performed by: NURSE PRACTITIONER

## 2022-05-31 PROCEDURE — 99204 OFFICE O/P NEW MOD 45 MIN: CPT | Performed by: NURSE PRACTITIONER

## 2022-05-31 PROCEDURE — 94618 PULMONARY STRESS TESTING: CPT | Performed by: NURSE PRACTITIONER

## 2022-05-31 RX ORDER — ALBUTEROL SULFATE 90 UG/1
2 AEROSOL, METERED RESPIRATORY (INHALATION) EVERY 4 HOURS PRN
Qty: 6.7 G | Refills: 11 | Status: SHIPPED | OUTPATIENT
Start: 2022-05-31

## 2022-05-31 RX ORDER — TRIAMCINOLONE ACETONIDE 1 MG/G
CREAM TOPICAL
COMMUNITY

## 2022-05-31 RX ORDER — PANTOPRAZOLE SODIUM 40 MG/1
TABLET, DELAYED RELEASE ORAL
COMMUNITY

## 2022-05-31 NOTE — PROGRESS NOTES
"Chief Complaint  Emphysema    Subjective        Keagan Barrera presents to [facility]  History of Present Illness     Mr. Barrera is a 41-year-old male with a medical history significant for anxiety, depression and emphysema.    He presents today for an evaluation of emphysema.  He states that he has shortness of breath that is worse with exertion.  He reports that his shortness of breath started about a year ago.  He states that he recently underwent a CT of the chest during an emergency department visit.  CT chest showed emphysema.  He also tells me that he wakes up throughout the night gasping for air.  He also complains of a dry cough.  He is a current smoker of about 1 pack/day and has smoked for 18 years.      Objective   Vital Signs:  /78   Pulse 83   Temp 98 °F (36.7 °C) (Temporal)   Ht 180.3 cm (71\")   Wt 52.2 kg (115 lb)   SpO2 98%   BMI 16.04 kg/m²     BMI is below normal parameters (malnutrition). Recommendations: treating the underlying disease process      Physical Exam     GENERAL APPEARANCE: Well developed, well nourished, alert and cooperative, and appears to be in no acute distress.    HEAD: normocephalic. Atraumatic.    EYES: PERRL, EOMI. Vision is grossly intact.    THROAT: Oral cavity and pharynx normal. No inflammation, swelling, exudate, or lesions.     NECK: Neck supple.  No thyromegaly.    CARDIAC: Normal S1 and S2. No S3, S4 or murmurs. Rhythm is regular.     RESPIRATORY:Bilateral air entry positive. Bilateral diminished breath sounds. No wheezing, crackles or rhonchi noted.    GI: Positive bowel sounds. Soft, nondistended, nontender.     MUSCULOSKELETAL: No significant deformity or joint abnormality. No edema. Peripheral pulses intact. No varicosities.    NEUROLOGICAL: Strength and sensation symmetric and intact throughout.     PSYCHIATRIC: The mental examination revealed the patient was oriented to person, place, and time. "     Result Review :  The following data was reviewed by: VALERIE Terrell on 05/31/2022:  Common labs    Common Labsle 5/21/22 5/21/22    1815 1815   Glucose  94   BUN  2 (A)   Creatinine  0.50 (A)   Sodium  139   Potassium  3.2 (A)   Chloride  99   Calcium  8.7   Albumin  3.03 (A)   Total Bilirubin  0.4   Alkaline Phosphatase  157 (A)   AST (SGOT)  18   ALT (SGPT)  10   WBC 10.85 (A)    Hemoglobin 13.5    Hematocrit 40.1    Platelets 250    (A) Abnormal value            Data reviewed: CT chest          Assessment and Plan   Diagnoses and all orders for this visit:    1. Centrilobular emphysema (HCC) (Primary)  -     Full Pulmonary Function Test With Bronchodilator; Future  -     Overnight Sleep Oximetry Study; Future    2. Protein-calorie malnutrition, unspecified severity (HCC)    3. Cigarette nicotine dependence without complication    Other orders  -     albuterol sulfate  (90 Base) MCG/ACT inhaler; Inhale 2 puffs Every 4 (Four) Hours As Needed for Wheezing.  Dispense: 6.7 g; Refill: 11  -     Fluticasone-Umeclidin-Vilant (Trelegy Ellipta) 200-62.5-25 MCG/INH inhaler; Inhale 1 puff Daily.  Dispense: 60 each; Refill: 5        Centrilobular emphysema:  CT chest was reviewed.  We will order PFT to assess lung function.  We will start him on Trelegy inhaler once daily.  Inhaler education was provided.       - Inhaler technique demonstration/discussion:  I have extensively discussed the steps.  In summary, the steps were discussed in the following order.Patient was advised to rinse the mouth after steroid inhalation to prevent fungal mucositis.  · Remove the cap from the inhaler and shake well.    · Breathe out all the way.    · Place the mouthpiece of the inhaler between your teeth and seal your lips tightly around it.    · As you start to breathe in slowly, press down on the canister one time.   · Keep breathing in as slowly and deeply as you can.    · It should take about 5 seconds for you to  completely breathe in.    · Hold your breath for 10 seconds(count to 10 slowly) to allow the medication to reach the airways of the lung.      We will also start him on albuterol every 4 hours as needed.  Obtained swab for alpha-1 antitrypsin genotype.      6-minute walk test was completed in office.  Oxygen saturation stayed at 98%.  Patient stopped after 4 minutes due to leg pain.  We will order an overnight pulse oximetry study to assess oxygen saturation during sleep.      Current smoker:    Smoking about 1 pack/day.    Keagan Barrera  reports that he has been smoking cigarettes. He has been smoking about 1.00 pack per day. He has never used smokeless tobacco.. I have educated him on the risk of diseases from using tobacco products such as cancer, COPD and heart disease.     I advised him to quit and he is not willing to quit.               Follow Up   Return in about 3 months (around 8/31/2022).  Patient was given instructions and counseling regarding his condition or for health maintenance advice. Please see specific information pulled into the AVS if appropriate.

## 2022-06-03 ENCOUNTER — TELEPHONE (OUTPATIENT)
Dept: PULMONOLOGY | Facility: CLINIC | Age: 42
End: 2022-06-03

## 2022-06-03 NOTE — TELEPHONE ENCOUNTER
----- Message from VALERIE Terrell sent at 6/3/2022 10:04 AM EDT -----  Will you let him know that he does not qualify for oxygen at night      ----- Message -----  From: Mague Taylor Incoming  Sent: 6/2/2022   4:44 PM EDT  To: VALERIE Terrell

## 2022-06-09 ENCOUNTER — HOSPITAL ENCOUNTER (OUTPATIENT)
Dept: RESPIRATORY THERAPY | Facility: HOSPITAL | Age: 42
Discharge: HOME | End: 2022-06-09
Admitting: NURSE PRACTITIONER

## 2022-06-09 VITALS — HEART RATE: 67 BPM | OXYGEN SATURATION: 99 % | RESPIRATION RATE: 16 BRPM

## 2022-06-09 DIAGNOSIS — J43.2 CENTRILOBULAR EMPHYSEMA: ICD-10-CM

## 2022-06-09 PROCEDURE — 94664 DEMO&/EVAL PT USE INHALER: CPT

## 2022-06-09 PROCEDURE — 94060 EVALUATION OF WHEEZING: CPT | Performed by: INTERNAL MEDICINE

## 2022-06-09 PROCEDURE — 94726 PLETHYSMOGRAPHY LUNG VOLUMES: CPT | Performed by: INTERNAL MEDICINE

## 2022-06-09 PROCEDURE — 94799 UNLISTED PULMONARY SVC/PX: CPT

## 2022-06-09 PROCEDURE — 94640 AIRWAY INHALATION TREATMENT: CPT

## 2022-06-09 PROCEDURE — 94060 EVALUATION OF WHEEZING: CPT

## 2022-06-09 PROCEDURE — 94726 PLETHYSMOGRAPHY LUNG VOLUMES: CPT

## 2022-06-09 PROCEDURE — 94729 DIFFUSING CAPACITY: CPT | Performed by: INTERNAL MEDICINE

## 2022-06-09 PROCEDURE — 94729 DIFFUSING CAPACITY: CPT

## 2022-06-09 RX ORDER — ALBUTEROL SULFATE 2.5 MG/3ML
2.5 SOLUTION RESPIRATORY (INHALATION) ONCE
Status: COMPLETED | OUTPATIENT
Start: 2022-06-09 | End: 2022-06-09

## 2022-06-09 RX ADMIN — ALBUTEROL SULFATE 2.5 MG: 2.5 SOLUTION RESPIRATORY (INHALATION) at 15:35

## 2022-06-17 ENCOUNTER — TELEPHONE (OUTPATIENT)
Dept: PULMONOLOGY | Facility: CLINIC | Age: 42
End: 2022-06-17

## 2022-06-17 NOTE — TELEPHONE ENCOUNTER
----- Message from VALERIE Terrell sent at 6/16/2022 11:17 AM EDT -----  We let him know that his PFT overall is normal.  We will continue his Trelegy inhaler.  ----- Message -----  From: Renan Kraus MD  Sent: 6/13/2022   9:05 AM EDT  To: VALERIE Terrell